# Patient Record
Sex: FEMALE | URBAN - METROPOLITAN AREA
[De-identification: names, ages, dates, MRNs, and addresses within clinical notes are randomized per-mention and may not be internally consistent; named-entity substitution may affect disease eponyms.]

---

## 2023-02-08 ENCOUNTER — APPOINTMENT (RX ONLY)
Dept: URBAN - METROPOLITAN AREA CLINIC 35 | Facility: CLINIC | Age: 43
Setting detail: DERMATOLOGY
End: 2023-02-08

## 2023-02-08 ENCOUNTER — RX ONLY (OUTPATIENT)
Age: 43
Setting detail: RX ONLY
End: 2023-02-08

## 2023-02-08 DIAGNOSIS — L71.8 OTHER ROSACEA: ICD-10-CM

## 2023-02-08 PROCEDURE — ? PRESCRIPTION MEDICATION MANAGEMENT

## 2023-02-08 PROCEDURE — ? SKIN MEDICINALS

## 2023-02-08 PROCEDURE — ? COUNSELING

## 2023-02-08 PROCEDURE — ? DIAGNOSIS COMMENT

## 2023-02-08 PROCEDURE — 99203 OFFICE O/P NEW LOW 30 MIN: CPT

## 2023-02-08 PROCEDURE — ? PRESCRIPTION

## 2023-02-08 RX ORDER — DOXYCYCLINE 40 MG/1
CAPSULE ORAL
Qty: 30 | Refills: 3 | Status: CANCELLED

## 2023-02-08 RX ORDER — OXYMETAZOLINE HYDROCHLORIDE 1 G/100G
CREAM TOPICAL
Qty: 30 | Refills: 4 | Status: ERX | COMMUNITY
Start: 2023-02-08

## 2023-02-08 RX ORDER — DOXYCYCLINE HYCLATE 20 MG/1
40MG TABLET, FILM COATED ORAL QD
Qty: 60 | Refills: 3 | Status: ERX | COMMUNITY
Start: 2023-02-08

## 2023-02-08 RX ADMIN — OXYMETAZOLINE HYDROCHLORIDE: 1 CREAM TOPICAL at 00:00

## 2023-02-08 ASSESSMENT — LOCATION DETAILED DESCRIPTION DERM: LOCATION DETAILED: RIGHT INFERIOR CENTRAL MALAR CHEEK

## 2023-02-08 ASSESSMENT — LOCATION SIMPLE DESCRIPTION DERM: LOCATION SIMPLE: RIGHT CHEEK

## 2023-02-08 ASSESSMENT — LOCATION ZONE DERM: LOCATION ZONE: FACE

## 2023-02-08 NOTE — PROCEDURE: DIAGNOSIS COMMENT
Comment: - Redness on cheeks that comes and goes, along with acne-like bumps\\n- Previously treated as acne with unspecified topical that irritated skin
Render Risk Assessment In Note?: no
Detail Level: Simple

## 2023-02-08 NOTE — PROCEDURE: PRESCRIPTION MEDICATION MANAGEMENT
Plan: Consider PDL for erythema on cheeks
Detail Level: Zone
Initiate Treatment: - Rhofade cream qAM\\n- SkinMedicinals (azelaic acid, ivermectin, metronidazole) qHS\\n- Doxy 40mg QD with food
Render In Strict Bullet Format?: No

## 2023-02-08 NOTE — PROCEDURE: SKIN MEDICINALS
Sig: Apply pea sized amount per area at night
Sig: Apply a thin layer to the affected skin twice daily
Sig: Apply nightly to warts nightly under occlusion
Product Type (1): Rosacea
Sig: Apply a thin layer to the affected areas twice daily
Sig: Take one twice daily
Sig: Apply to affected areas twice daily
Sig: Wash affected areas daily.
Sig: Apply a thin layer to the scar daily
Rosacea Medicines: Azelaic Acid 15%, Ivermectin 1%, Metronidazole 1% Cream
Sig: Apply twice daily for 5 days
Sig: Apply a thin layer to the itching areas twice daily as needed
Sig: Apply a thin layer to the affected areas daily
Intro Statement: I recommended the following products:
Sig: Apply to affected areas on face twice daily
Detail Level: Simple